# Patient Record
Sex: FEMALE | URBAN - METROPOLITAN AREA
[De-identification: names, ages, dates, MRNs, and addresses within clinical notes are randomized per-mention and may not be internally consistent; named-entity substitution may affect disease eponyms.]

---

## 2019-02-28 ENCOUNTER — OFFICE VISIT (OUTPATIENT)
Dept: URBAN - METROPOLITAN AREA CLINIC 76 | Facility: CLINIC | Age: 24
End: 2019-02-28
Payer: COMMERCIAL

## 2019-02-28 DIAGNOSIS — H52.13 MYOPIA, BILATERAL: Primary | ICD-10-CM

## 2019-02-28 DIAGNOSIS — H04.123 DRY EYE SYNDROME OF BILATERAL LACRIMAL GLANDS: ICD-10-CM

## 2019-02-28 DIAGNOSIS — H10.45 OTHER CHRONIC ALLERGIC CONJUNCTIVITIS: ICD-10-CM

## 2019-02-28 DIAGNOSIS — T37.2X5D ADVERSE EFFECT OF ANTIMALARIALS AND DRUGS ACTING ON OTHER BLOOD PROTOZOA, SUBSEQUENT ENCOUNTER: ICD-10-CM

## 2019-02-28 PROCEDURE — 92310 CONTACT LENS FITTING OU: CPT | Performed by: OPTOMETRIST

## 2019-02-28 PROCEDURE — 92014 COMPRE OPH EXAM EST PT 1/>: CPT | Performed by: OPTOMETRIST

## 2019-02-28 PROCEDURE — 92015 DETERMINE REFRACTIVE STATE: CPT | Performed by: OPTOMETRIST

## 2019-02-28 RX ORDER — OLOPATADINE HYDROCHLORIDE 7 MG/ML
0.7 % SOLUTION OPHTHALMIC
Qty: 1 | Refills: 6 | Status: INACTIVE
Start: 2019-02-28 | End: 2019-02-28

## 2019-02-28 RX ORDER — KETOTIFEN FUMARATE 0.35 MG/ML
SOLUTION/ DROPS OPHTHALMIC
Qty: 1 | Refills: 6 | Status: INACTIVE
Start: 2019-02-28 | End: 2019-03-11

## 2019-02-28 ASSESSMENT — KERATOMETRY
OS: 41.75
OD: 42.38

## 2019-02-28 ASSESSMENT — VISUAL ACUITY
OS: 20/25
OD: 20/25

## 2019-02-28 ASSESSMENT — INTRAOCULAR PRESSURE
OS: 13
OD: 13

## 2019-02-28 NOTE — IMPRESSION/PLAN
Impression: Multiple sclerosis: G35. OU. Plan: Discussed diagnosis in detail with patient. Patient is currently taking medication (Merrily Pies), continue follow up with neuro. No signs of optic neuritis, uveitis, or mac edema. Recommend  VF 24-2 in 2-3 weeks.

## 2019-02-28 NOTE — IMPRESSION/PLAN
Impression: Adverse effect of antimalarials and drugs acting on other blood protozoa, subsequent encounter: T37.2X5D. OU.  Plan: See plan above

## 2019-02-28 NOTE — IMPRESSION/PLAN
Impression: Other chronic allergic conjunctivitis: H10.45. OU. Plan: Discussed diagnosis with patient. Recommend OTC oral antihistamine, and P[place]o 1 drop QD ou, prn. Rub excess into lids.

## 2019-02-28 NOTE — IMPRESSION/PLAN
Impression: Myopia, bilateral: H52.13 OU. Plan: Discussed diagnosis with patient. Dispensed new MRx today. Order trial ctls trials. If pt happy with comfort and vision w/ ctls, ok to finalize. Pt to call with any concerns.

## 2019-03-15 ENCOUNTER — OFFICE VISIT (OUTPATIENT)
Dept: URBAN - METROPOLITAN AREA CLINIC 76 | Facility: CLINIC | Age: 24
End: 2019-03-15
Payer: MEDICARE

## 2019-03-15 DIAGNOSIS — G35 MULTIPLE SCLEROSIS: ICD-10-CM

## 2019-03-15 DIAGNOSIS — T37.2X5S ADVRS EFFECT OF ANTIMALARI/DRUGS ACTING ON BLD PROTZOA, SQLA: Primary | ICD-10-CM

## 2019-03-15 PROCEDURE — 99213 OFFICE O/P EST LOW 20 MIN: CPT | Performed by: OPTOMETRIST

## 2019-03-15 PROCEDURE — 92083 EXTENDED VISUAL FIELD XM: CPT | Performed by: OPTOMETRIST

## 2019-03-15 NOTE — IMPRESSION/PLAN
Impression: Advrs effect of antimalari/drugs acting on bld protzoa, sqla: T37.2X5S. OU. Plan: yearly 24-2 AVF to monitor due to MS meds.

## 2020-07-24 ENCOUNTER — OFFICE VISIT (OUTPATIENT)
Dept: URBAN - METROPOLITAN AREA CLINIC 76 | Facility: CLINIC | Age: 25
End: 2020-07-24
Payer: COMMERCIAL

## 2020-07-24 PROCEDURE — 92012 INTRM OPH EXAM EST PATIENT: CPT | Performed by: OPTOMETRIST

## 2020-07-24 PROCEDURE — 92310 CONTACT LENS FITTING OU: CPT | Performed by: OPTOMETRIST

## 2020-07-24 ASSESSMENT — INTRAOCULAR PRESSURE
OD: 9
OS: 10

## 2020-07-24 ASSESSMENT — KERATOMETRY
OS: 42.38
OD: 42.25

## 2020-07-24 ASSESSMENT — VISUAL ACUITY
OS: 20/25
OD: 20/25

## 2020-07-24 NOTE — IMPRESSION/PLAN
Impression: Myopia, bilateral: H52.13. Plan: Discussed diagnosis with patient. Dispensed new MRx today. Order ctls trials. Went over [employer]. If pt happy with comfort and vision w/ ctls, ok to finalize. Pt to call with any concerns.

## 2022-04-05 ENCOUNTER — OFFICE VISIT (OUTPATIENT)
Dept: URBAN - METROPOLITAN AREA CLINIC 76 | Facility: CLINIC | Age: 27
End: 2022-04-05
Payer: COMMERCIAL

## 2022-04-05 PROCEDURE — 92014 COMPRE OPH EXAM EST PT 1/>: CPT | Performed by: OPTOMETRIST

## 2022-04-05 ASSESSMENT — VISUAL ACUITY
OS: 20/20
OD: 20/20

## 2022-04-05 ASSESSMENT — INTRAOCULAR PRESSURE
OS: 10
OD: 10

## 2022-04-05 ASSESSMENT — KERATOMETRY
OD: 42.00
OS: 42.00

## 2022-04-05 NOTE — IMPRESSION/PLAN
Impression: Myopia, bilateral: H52.13. Plan: Discussed diagnosis with patient. Dispensed new MRx today. Put in contact trials, pt will come in at a later date to pay fee and then we will order trials. Pt to call with any concerns.

## 2022-04-05 NOTE — IMPRESSION/PLAN
Impression: Advrs effect of antimalari/drugs acting on bld protzoa, sqla: T37.2X5S. OU. Pt has been on Gilenya for years for MS, doctor may be switching soon. Plan: Discussed. Do for yearly testing due to MS meds.